# Patient Record
Sex: MALE
[De-identification: names, ages, dates, MRNs, and addresses within clinical notes are randomized per-mention and may not be internally consistent; named-entity substitution may affect disease eponyms.]

---

## 2019-10-28 ENCOUNTER — HOSPITAL ENCOUNTER (OUTPATIENT)
Dept: HOSPITAL 5 - LAB | Age: 69
Discharge: HOME | End: 2019-10-28
Attending: INTERNAL MEDICINE
Payer: COMMERCIAL

## 2019-10-28 DIAGNOSIS — E11.9: ICD-10-CM

## 2019-10-28 DIAGNOSIS — Z13.21: ICD-10-CM

## 2019-10-28 DIAGNOSIS — Z12.5: Primary | ICD-10-CM

## 2019-10-28 DIAGNOSIS — Z13.29: ICD-10-CM

## 2019-10-28 DIAGNOSIS — E78.5: ICD-10-CM

## 2019-10-28 LAB
ALBUMIN SERPL-MCNC: 4.4 G/DL (ref 3.9–5)
ALT SERPL-CCNC: 26 UNITS/L (ref 7–56)
BASOPHILS # (AUTO): 0 K/MM3 (ref 0–0.1)
BASOPHILS NFR BLD AUTO: 0.4 % (ref 0–1.8)
BUN SERPL-MCNC: 13 MG/DL (ref 9–20)
BUN/CREAT SERPL: 13 %
CALCIUM SERPL-MCNC: 9.6 MG/DL (ref 8.4–10.2)
EOSINOPHIL # BLD AUTO: 0.1 K/MM3 (ref 0–0.4)
EOSINOPHIL NFR BLD AUTO: 3.6 % (ref 0–4.3)
HCT VFR BLD CALC: 43.3 % (ref 35.5–45.6)
HDLC SERPL-MCNC: 47 MG/DL (ref 40–59)
HEMOLYSIS INDEX: 3
HGB BLD-MCNC: 14.7 GM/DL (ref 11.8–15.2)
LYMPHOCYTES # BLD AUTO: 1.9 K/MM3 (ref 1.2–5.4)
LYMPHOCYTES NFR BLD AUTO: 44.3 % (ref 13.4–35)
MCHC RBC AUTO-ENTMCNC: 34 % (ref 32–34)
MCV RBC AUTO: 96 FL (ref 84–94)
MONOCYTES # (AUTO): 0.5 K/MM3 (ref 0–0.8)
MONOCYTES % (AUTO): 11 % (ref 0–7.3)
PLATELET # BLD: 226 K/MM3 (ref 140–440)
RBC # BLD AUTO: 4.51 M/MM3 (ref 3.65–5.03)

## 2019-10-28 PROCEDURE — 84153 ASSAY OF PSA TOTAL: CPT

## 2019-10-28 PROCEDURE — 82607 VITAMIN B-12: CPT

## 2019-10-28 PROCEDURE — 80053 COMPREHEN METABOLIC PANEL: CPT

## 2019-10-28 PROCEDURE — 84443 ASSAY THYROID STIM HORMONE: CPT

## 2019-10-28 PROCEDURE — 85025 COMPLETE CBC W/AUTO DIFF WBC: CPT

## 2019-10-28 PROCEDURE — 80061 LIPID PANEL: CPT

## 2019-10-28 PROCEDURE — 82306 VITAMIN D 25 HYDROXY: CPT

## 2019-10-28 PROCEDURE — 83036 HEMOGLOBIN GLYCOSYLATED A1C: CPT

## 2019-10-28 PROCEDURE — 36415 COLL VENOUS BLD VENIPUNCTURE: CPT

## 2019-10-30 LAB — VITAMIN D2 SERPL-MCNC: <4 NG/ML

## 2020-02-19 ENCOUNTER — HOSPITAL ENCOUNTER (OUTPATIENT)
Dept: HOSPITAL 5 - XRAY | Age: 70
Discharge: HOME | End: 2020-02-19
Attending: INTERNAL MEDICINE
Payer: MEDICARE

## 2020-02-19 DIAGNOSIS — R05: Primary | ICD-10-CM

## 2020-02-19 DIAGNOSIS — M25.78: ICD-10-CM

## 2020-02-19 DIAGNOSIS — M47.814: ICD-10-CM

## 2020-02-19 PROCEDURE — 71046 X-RAY EXAM CHEST 2 VIEWS: CPT

## 2020-02-19 NOTE — XRAY REPORT
CHEST 2 VIEWS 



INDICATION: 

[id number] COUGH.



COMPARISON: 

None.



FINDINGS:

Support devices: None.



Heart: Within normal limits. 

Pulmonary vasculature: Normal.

Lungs/pleura: The lungs are normally expanded and clear. No pleural effusion.  No pneumothorax.



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



IMPRESSION:

1. No acute findings.



Signer Name: Seferino Fairbanks MD 

Signed: 2/19/2020 11:53 AM

 Workstation Name: [id number]